# Patient Record
Sex: FEMALE | Race: WHITE | Employment: OTHER | URBAN - METROPOLITAN AREA
[De-identification: names, ages, dates, MRNs, and addresses within clinical notes are randomized per-mention and may not be internally consistent; named-entity substitution may affect disease eponyms.]

---

## 2019-05-17 ENCOUNTER — OFFICE VISIT (OUTPATIENT)
Dept: NEUROLOGY | Age: 79
End: 2019-05-17

## 2019-05-17 VITALS
SYSTOLIC BLOOD PRESSURE: 132 MMHG | HEART RATE: 70 BPM | OXYGEN SATURATION: 97 % | BODY MASS INDEX: 28 KG/M2 | WEIGHT: 164 LBS | RESPIRATION RATE: 20 BRPM | HEIGHT: 64 IN | DIASTOLIC BLOOD PRESSURE: 86 MMHG

## 2019-05-17 DIAGNOSIS — R29.2 HYPERREFLEXIA: ICD-10-CM

## 2019-05-17 DIAGNOSIS — R26.81 UNSTEADY GAIT: Primary | ICD-10-CM

## 2019-05-17 NOTE — PROGRESS NOTES
Name:  Tino Gonzales      :  1940    PCP:   Andreas Kuo MD      Referring:  As above  MRN:   [id number]    Chief Complaint:   Chief Complaint   Patient presents with   Julien Schwab     New Patient (Balance Issues)       HISTORY OF PRESENT ILLNESS:     This is a [de-identified] y.o. female who presents for evaluation of unsteady gait    She describes that she's had some gait difficulty starting around 10 years ago but noticeably worse over the past 3-4 years. She can be walking and suddenly fall in any direction (forward, backwards, side), no warning. She denies any dizziness, spinning, light-headedness, or loss of awareness, or chest discomfort/ palpitations when she falls. Doesn't thinks her legs get weak before she falls. Denies any numbness or tingling in feet or legs. No tremor (at rest or when she's reaching/ holding things). PCP referred her to PT-Balance Therapy sometime in the past 1-2 years and she says it temporarily improved her gait. Complete Review of Systems: +falls, skipped beats; otherwise as noted in HPI     Allergies   Allergen Reactions    Aspirin Hives     Past Medical History:   Diagnosis Date    Hypertension     PUD (peptic ulcer disease)      Current Outpatient Medications   Medication Sig Dispense Refill    amLODIPine (NORVASC) 5 mg tablet Take 5 mg by mouth daily.  ondansetron (ZOFRAN ODT) 4 mg disintegrating tablet Take 1 Tab by mouth every eight (8) hours as needed for Nausea. 10 Tab 0    HYDROcodone-acetaminophen (NORCO) 5-325 mg per tablet 1-2 tabs every 4hrs as needed for pain. Maximum daily dose 12 tablets. 80 Tab 0    docusate sodium (COLACE) 50 mg capsule Take two tabs twice daily for two weeks, then twice a day as needed thereafter. Hold for loose stools. 60 Cap 2    ondansetron (ZOFRAN ODT) 8 mg disintegrating tablet Take 1 Tab by mouth every eight (8) hours as needed for Nausea.  20 Tab 2    topiramate (TOPAMAX) 25 mg tablet Take 50 mg by mouth daily (with breakfast). Indications: MIGRAINE PREVENTION      oxyCODONE-acetaminophen (PERCOCET) 5-325 mg per tablet Take 1 Tab by mouth every four (4) hours as needed for Pain.  baclofen (LIORESAL) 10 mg tablet Take 10 mg by mouth three (3) times daily. Past Surgical History:   Procedure Laterality Date    HX GYN      Hysterectomy    HX OTHER SURGICAL      rectal cysocele     Family History   Problem Relation Age of Onset    Stroke Mother      Social History     Socioeconomic History    Marital status: UNKNOWN     Spouse name: Not on file    Number of children: Not on file    Years of education: Not on file    Highest education level: Not on file   Occupational History    Not on file   Social Needs    Financial resource strain: Not on file    Food insecurity:     Worry: Not on file     Inability: Not on file    Transportation needs:     Medical: Not on file     Non-medical: Not on file   Tobacco Use    Smoking status: Former Smoker   Substance and Sexual Activity    Alcohol use:  Yes     Alcohol/week: 1.2 oz     Types: 2 Glasses of wine per week     Comment: 2 drinks per week    Drug use: Not on file    Sexual activity: Not on file   Lifestyle    Physical activity:     Days per week: Not on file     Minutes per session: Not on file    Stress: Not on file   Relationships    Social connections:     Talks on phone: Not on file     Gets together: Not on file     Attends Protestant service: Not on file     Active member of club or organization: Not on file     Attends meetings of clubs or organizations: Not on file     Relationship status: Not on file    Intimate partner violence:     Fear of current or ex partner: Not on file     Emotionally abused: Not on file     Physically abused: Not on file     Forced sexual activity: Not on file   Other Topics Concern    Not on file   Social History Narrative    Not on file       PHYSICAL EXAM  Vitals:    05/17/19 1004 05/17/19 1058 05/17/19 1059 05/17/19 1101 BP: 118/78 130/80 132/84 132/86   BP 1 Location: Left arm  Right arm Right arm   BP Patient Position: Sitting  Sitting Standing   Pulse: 65 71 87 70   Resp: 20      SpO2: 99% 95% 97% 97%   Weight: 74.4 kg (164 lb)      Height: 5' 4\" (1.626 m)          General:  Alert, cooperative, NAD   Head:  Normocephalic, atraumatic. Eyes:  Conjunctivae/corneas clear   Lungs:  Heart:  Non labored breathing  Regular rate, rhythm; + Cardiac Murmur (chronic)   Extremities: No edema. Skin: No rashes    Neurologic Exam       Language: normal  Memory:  Alert, oriented to person, place, situation    Cranial Nerves:  I: smell Not tested   II: visual fields Full to confrontation   II: pupils Equal, round, reactive to light   II: optic disc No papilledema   III,VII: ptosis Mild chronic ptosis of right side; no ptosis of left side   III,IV,VI: extraocular muscles  normal   V: facial light touch sensation  normal   VII: facial muscle function  symmetric   VIII: hearing symmetric   IX: soft palate elevation  normal   XI: sternocleidomastoid strength 5/5   XII: tongue  midline      Motor: symmetric bulk; normal tone in arms, slight/ borderline increased tone in legs    Upper exts: 5/5  Lower exts: 5/5    Sensory: intact to LT, PP, temp, vibration x 4 exts     Cerebellar: no rest, postural, or intention tremor  Normal FNF    Reflexes: slightly brisk throughout 2-3+ throughout    Plantar response: up-going on left, neutral on right      Gait: ambulates with left leg slightly dragging behind right leg    Romberg negative     No outside clinic notes/ imaging reports available for review    ASSESSMENT AND PLAN    ICD-10-CM ICD-9-CM    1. Unsteady gait R26.81 781.2 MRI BRAIN WO CONT      MRI CERV SPINE WO CONT   2. Hyperreflexia R29.2 796.1 MRI BRAIN WO CONT      MRI CERV SPINE WO CONT     [de-identified] y.o. female with chronic gait difficulty, increasing over the past 3-4 years.  Now having frequent falls without warning, no loss of awareness, no dizziness or vertigo. Exam showed mild generalized hyperreflexia, up-going toe on left, no definite spasticity, and gait was abnormal with left leg appearing to have slight drag/ weakness when walking. Sensory exam was normal, and Romberg was negative. Check MRI Brain to evaluate for structural lesion (?old stroke) and MRI C-spine to eval for possible cervical myelopathy. Pt agreeable with that plan. F/u in 4-6 weeks to go over results. Thank you for allowing me to be a part of your patient's care. Please call me at [phone] if you have any questions.      Sincerely,  Olivia Nguyen MD

## 2019-05-17 NOTE — PROGRESS NOTES
Patient is here as a new patient for falls. She states that she has had frequent falls over the past 4-5 years. Have increased recently. She states that she feels like her legs give out. This past fall, she hit her head on concrete. No other concerns at this time.

## 2019-05-30 ENCOUNTER — HOSPITAL ENCOUNTER (OUTPATIENT)
Dept: MRI IMAGING | Age: 79
Discharge: HOME | End: 2019-05-30
Attending: PSYCHIATRY & NEUROLOGY
Payer: MEDICARE

## 2019-05-30 DIAGNOSIS — R26.81 UNSTEADY GAIT: ICD-10-CM

## 2019-05-30 DIAGNOSIS — R29.2 HYPERREFLEXIA: ICD-10-CM

## 2019-05-30 PROCEDURE — 70551 MRI BRAIN STEM W/O DYE: CPT

## 2019-05-30 PROCEDURE — 72141 MRI NECK SPINE W/O DYE: CPT

## 2019-06-19 ENCOUNTER — OFFICE VISIT (OUTPATIENT)
Dept: NEUROLOGY | Age: 79
End: 2019-06-19

## 2019-06-19 VITALS
HEIGHT: 64 IN | RESPIRATION RATE: 20 BRPM | SYSTOLIC BLOOD PRESSURE: 118 MMHG | OXYGEN SATURATION: 98 % | DIASTOLIC BLOOD PRESSURE: 78 MMHG | HEART RATE: 66 BPM | BODY MASS INDEX: 28 KG/M2 | WEIGHT: 164 LBS

## 2019-06-19 DIAGNOSIS — R26.81 UNSTEADY GAIT: ICD-10-CM

## 2019-06-19 DIAGNOSIS — R41.3 MEMORY DIFFICULTY: Primary | ICD-10-CM

## 2019-06-19 NOTE — PROGRESS NOTES
Patient is here for a follow up for unsteady gait and hyperflexia. Has had her MRI brain and c-spine done. She states that she has been feeling some days are a little shaky but for the most part, pretty good. No falls since she had her last appointment.

## 2019-06-19 NOTE — PROGRESS NOTES
Neurology Progress Note    Patient ID:   Geo Lisseth  [id number]  [de-identified] y.o.  1940    Date of Office Visit: 06/19/19    Chief Complaint   Patient presents with    Neurologic Problem     Follow Up (unsteady gait, hyperflexia)     Interval Hx:     Reviewed MRI Brain and C-spine images and reports. MRI Brain shows: cerebral atrophy with enlarged ventricles, mild more than moderate chronic small vessel ischemic changes. No evidence of stroke. MRI C-spine: mild disc bulges C5-6 to C7-T1, mild central canal stenosis at C6-7, no spinal compression, no spinal cord lesions. Pt continues to have episodic unsteady gait. She denies any frequent issues with urinary urgency, denies any urinary incontinence. On questioning, pt says she's had some mild forgetfulness over the past 2 years. Freda Jamie to remember the names of certain things (flowers for example) or remember someone's name. Seems to come back to her soon, by the next day. Objective: Allergies   Allergen Reactions    Aspirin Hives       PMHx:  Past Medical History:   Diagnosis Date    Hypertension     PUD (peptic ulcer disease)      PSHx:  has a past surgical history that includes hx gyn and hx other surgical.    Current Outpatient Medications on File Prior to Visit   Medication Sig    amLODIPine (NORVASC) 5 mg tablet Take 5 mg by mouth daily.  ondansetron (ZOFRAN ODT) 4 mg disintegrating tablet Take 1 Tab by mouth every eight (8) hours as needed for Nausea.  HYDROcodone-acetaminophen (NORCO) 5-325 mg per tablet 1-2 tabs every 4hrs as needed for pain. Maximum daily dose 12 tablets.  docusate sodium (COLACE) 50 mg capsule Take two tabs twice daily for two weeks, then twice a day as needed thereafter. Hold for loose stools.  ondansetron (ZOFRAN ODT) 8 mg disintegrating tablet Take 1 Tab by mouth every eight (8) hours as needed for Nausea.  topiramate (TOPAMAX) 25 mg tablet Take 50 mg by mouth daily (with breakfast). Indications: MIGRAINE PREVENTION    oxyCODONE-acetaminophen (PERCOCET) 5-325 mg per tablet Take 1 Tab by mouth every four (4) hours as needed for Pain.  baclofen (LIORESAL) 10 mg tablet Take 10 mg by mouth three (3) times daily. No current facility-administered medications on file prior to visit. Physical Exam  Vitals:    06/19/19 1422   BP: 118/78   Pulse: 66   Resp: 20   SpO2: 98%   Weight: 74.4 kg (164 lb)   Height: 5' 4\" (1.626 m)       General:   Mental status: Awake, alert, cooperative. Neck: supple  Extremities: no edema  Skin: no rashes    Neuro Exam:    CNs:   Facial movements: symmetric. Visual fields; intact in all quadrants, bilateral EOM: conjugate eye movements bilateral  Hearing: normal  Speech: no aphasia, no dysarthria, normal fluency    Motor:  Bulk: Normal, symmetric in all exts  Tone: normal in all extremities  Strength: 5/5 in upper and lower extremities, symmetric. Coordination: No resting, postural, or intention tremors. Sensory: intact to LT in all exts. Reflexes:  2+ patellars  Gait: stands independently, when starting to ambulate, leans to right and catches herself on wall. During ambulation, right leg doesn't seem to move as freely/ fluidly as left leg. Assessment:       ICD-10-CM ICD-9-CM    1. Memory difficulty R41.3 780.93 REFERRAL TO NEUROLOGY      VITAMIN B12      TSH 3RD GENERATION   2. Unsteady gait R26.81 781. 2 EMG LIMITED      PROTEIN ELECTROPHORESIS W/ REFLX STEVEN      ANTINUCLEAR ANTIBODIES, IFA     Check EMG/ NCS to evaluate for peripheral neuropathy  Check Neuropathy labs  Check B12/ TSH for memory eval  Refer to Payal Rodríguez for memory evaluation    Considering NPH but ventricles not dilated out of proportion to cerebral atrophy, and pt denies any significant/ noticeable bladder issues, though does report mild memory disturbance and gait disturbance.   Will check the above and if no clear answer to her gait difficulty, will refer her to [facility] for their opinion.          Signed:   Hector Jaramillo MD  June 19, 2019

## 2019-06-22 LAB
ALBUMIN SERPL ELPH-MCNC: 4.1 G/DL (ref 2.9–4.4)
ALBUMIN/GLOB SERPL: 1.3 {RATIO} (ref 0.7–1.7)
ALPHA1 GLOB SERPL ELPH-MCNC: 0.2 G/DL (ref 0–0.4)
ALPHA2 GLOB SERPL ELPH-MCNC: 0.7 G/DL (ref 0.4–1)
ANA HOMOGEN TITR SER: ABNORMAL {TITER}
ANA SPECKLED TITR SER: ABNORMAL {TITER}
ANA TITR SER IF: POSITIVE {TITER}
B-GLOBULIN SERPL ELPH-MCNC: 1.3 G/DL (ref 0.7–1.3)
GAMMA GLOB SERPL ELPH-MCNC: 1 G/DL (ref 0.4–1.8)
GLOBULIN SER CALC-MCNC: 3.2 G/DL (ref 2.2–3.9)
Lab: ABNORMAL
M PROTEIN SERPL ELPH-MCNC: NORMAL G/DL
PLEASE NOTE, 011150: NORMAL
PROT PATTERN SERPL ELPH-IMP: NORMAL
PROT SERPL-MCNC: 7.3 G/DL (ref 6–8.5)
TSH SERPL DL<=0.005 MIU/L-ACNC: 1.54 UIU/ML (ref 0.45–4.5)
VIT B12 SERPL-MCNC: 243 PG/ML (ref 232–1245)

## 2019-06-26 ENCOUNTER — TELEPHONE (OUTPATIENT)
Dept: NEUROLOGY | Age: 79
End: 2019-06-26

## 2019-06-26 NOTE — TELEPHONE ENCOUNTER
Dr Lawanda Nolan calling pt in office now needs last 2 office notes and Mri notes please fax [phone]

## 2019-07-03 DIAGNOSIS — R26.81 UNSTEADY GAIT: ICD-10-CM

## 2019-07-10 DIAGNOSIS — R76.8 ANA POSITIVE: Primary | ICD-10-CM

## 2019-07-10 NOTE — PROGRESS NOTES
Normal TSH and SPEP    B12 level is borderline low; pt will need to talk with PCP about starting B12 supplementation, and rechecking B12 in a few months. ZANDRA was abnormal/ positive, but is unrelated to her gait difficulty. This suggests she has some underlying inflammatory condition and will need to see Rheumatologist for further evaluation.   I entered a referral to [facility]/ Dr Dominguez Fuel

## 2019-07-11 ENCOUNTER — TELEPHONE (OUTPATIENT)
Dept: NEUROLOGY | Age: 79
End: 2019-07-11

## 2019-07-11 NOTE — TELEPHONE ENCOUNTER
Contacted patient to let her know that Doran Sandifer looked at her lab results, and per  \"Normal TSH and SPEP     B12 level is borderline low; pt will need to talk with PCP about starting B12 supplementation, and rechecking B12 in a few months.      ZANDRA was abnormal/ positive, but is unrelated to her gait difficulty. This suggests she has some underlying inflammatory condition and will need to see Rheumatologist for further evaluation. I entered a referral to [facility]/ Dr Lenard Mohs. \"     I stated to her that I will send the referral to 's office and they will call her with an appt. She verbalized understanding. She also wanted to check with  to see if she still needed to come in for the EMG. I stated to her that I will send him the message to ask him and then I will give her a call. She verbalized understanding.

## 2019-07-11 NOTE — TELEPHONE ENCOUNTER
Yes on the EMG. Its to help evaluate for any peripheral neuropathy, that could be related to her walking difficulty.

## 2019-07-15 ENCOUNTER — TELEPHONE (OUTPATIENT)
Dept: NEUROLOGY | Age: 79
End: 2019-07-15

## 2019-07-15 NOTE — TELEPHONE ENCOUNTER
Contacted patient back to let her know that per  \"Yes on the EMG. Its to help evaluate for any peripheral neuropathy, that could be related to her walking difficulty. \" she verbalized understanding and has made an appt with the rheumatologist and will be at our office for her EMG.

## 2019-07-15 NOTE — TELEPHONE ENCOUNTER
----- Message from Alexey Herr sent at 7/15/2019 12:15 PM EDT -----  Regarding: Dr Nowak/ telephone  The pt returned the call from the nurse on thursday and is requesting another back. She believes it was in regards to her EMG.     Best contact number is [phone]

## 2019-07-24 ENCOUNTER — OFFICE VISIT (OUTPATIENT)
Dept: NEUROLOGY | Age: 79
End: 2019-07-24

## 2019-07-24 DIAGNOSIS — R41.3 MEMORY DIFFICULTY: ICD-10-CM

## 2019-07-24 DIAGNOSIS — R26.81 UNSTEADY GAIT: Primary | ICD-10-CM

## 2019-07-24 NOTE — PROCEDURES
EMG/ NCS Report  [facility]  - [facility]  [address] [facility], [address] [place] [address] [address]   Ph: [phone]   FAX: [phone]/ [phone]  Test Date:  2019    Patient: Fabienne Fuentes : 1940 Physician: Svitlana Morales M.D. Sex: Female Height: ' \" Ref Phys: Svitlana Morales M.D.   ID#: [id number] Weight:  lbs. Technician: Sb Jauregui     Patient History:    CC: gait difficulty, unsteadiness. Evaluate for peripheral neuropathy    EMG & NCV Findings:  Evaluation of the right Fibular motor nerve showed normal distal onset latency (2.8 ms), normal amplitude (4.5 mV), normal conduction velocity (B Fib-Ankle, 49 m/s), and normal conduction velocity (Poplt-B Fib, 67 m/s). The right median motor nerve showed normal distal onset latency (3.9 ms), normal amplitude (7.5 mV), and normal conduction velocity (Elbow-Wrist, 49 m/s). The right tibial motor nerve showed normal distal onset latency (4.8 ms), normal amplitude (3.1 mV), and normal conduction velocity (Knee-Ankle, 48 m/s). The right ulnar motor nerve showed normal distal onset latency (3.0 ms), normal amplitude (8.2 mV), normal conduction velocity (B Elbow-Wrist, 61 m/s), and normal conduction velocity (A Elbow-B Elbow, 53 m/s). The right median sensory, the right radial sensory, the right sural sensory, and the right ulnar sensory nerves showed normal distal peak latency (R3.8, R2.2, R3.8, R3.3 ms) and normal amplitude (R36.8, R54.0, R4.2, R35.0 µV). The right Sup Fibular sensory nerve showed normal distal peak latency (2.6 ms), normal amplitude (5.2 µV), and normal conduction velocity (Lower leg-Lat ankle, 45 m/s).   The right median/ulnar (palm) comparison nerve showed normal distal onset latency (Median Palm, 1.8 ms), prolonged distal peak latency (Median Palm, 2.4 ms), normal amplitude (Median Palm, 27.2 µV), normal distal onset latency (Ulnar Palm, 1.4 ms), normal distal peak latency (Ulnar Palm, 2.2 ms), and normal amplitude (Ulnar Palm, 12.3 µV). All F Wave latencies were within normal limits. Needle evaluation of the right anterior tibialis muscle showed increased motor unit amplitude. All remaining muscles (as indicated in the following table) showed no evidence of electrical instability.       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      Nerve Conduction Studies  Anti Sensory Summary Table     Stim Site NR Peak (ms) Norm Peak (ms) P-T Amp (µV) Norm P-T Amp Site1 Site2 Dist (cm)   Right Median Anti Sensory (2nd Digit)  32.3°C   Wrist    3.8 <4 36.8 >13 Wrist 2nd Digit 14.0   Right Radial Anti Sensory (Base 1st Digit)  31.7°C   Wrist    2.2 <2.8 54.0 >11 Wrist Base 1st Digit 10.0   Right Sup Fibular Anti Sensory (Lat ankle)  32°C   Lower leg    2.6 <4.6 5.2 >4 Lower leg Lat ankle 10.0   Site 2    2.6  5.4       Site 3    2.3  4.0           2.6  1.9       Right Sural Anti Sensory (Lat Mall)  32.9°C   Calf    3.8 <4.5 4.2 >4.0 Calf Lat Mall 14.0   Site 2    3.6  4.8           3.8  5.6       Right Ulnar Anti Sensory (5th Digit)  32.2°C   Wrist    3.3 <4.0 35.0 >9 Wrist 5th Digit 14.0     Motor Summary Table     Stim Site NR Onset (ms) Norm Onset (ms) O-P Amp (mV) Norm O-P Amp Amp (Prev) (%) Site1 Site2 Dist (cm) Derrick (m/s) Norm Derrick (m/s)   Right Fibular Motor (Ext Dig Brev)  31.8°C   Ankle    2.8 <6.5 4.5 >1.1 100.0 Ankle Ext Dig Brev 8.0     B Fib    9.3  4.2  93.3 B Fib Ankle 32.0 49 >38   Poplt    10.8  4.1  97.6 Poplt B Fib 10.0 67 >42   Right Median Motor (Abd Poll Brev)  31.5°C   Wrist    3.9 <4.5 7.5 >4.1 100.0 Wrist Abd Poll Brev 8.0     Elbow    8.0  7.3  97.3 Elbow Wrist 20.0 49 >49   Right Tibial Motor (Abd Mcdonald Brev)  31.6°C   Ankle    4.8 <6.1 3.1 >1.1 100.0 Ankle Abd Mcdonald Brev 8.0 Knee    12.5  2.2  71.0 Knee Ankle 37.0 48 >39   Right Ulnar Motor (Abd Dig Minimi)  30.9°C   Wrist    3.0 <3.1 8.2 >7.0 100.0 Wrist Abd Dig Minimi 8.0  >50   B Elbow    6.1  7.0  85.4 B Elbow Wrist 19.0 61 >50   A Elbow    8.0  6.8  97.1 A Elbow B Elbow 10.0 53 >50     Comparison Summary Table     Stim Site NR Peak (ms) P-T Amp (µV) Site1 Site2 Dist (cm) Delta-0 (ms)   Right Median/Ulnar Palm Comparison (Wrist)  32.5°C   Median Palm    2.4 23.5 Median Palm Ulnar Palm 8.0 0.4   Ulnar Palm    2.2 10.2         F Wave Studies     NR F-Lat (ms) Lat Norm (ms) L-R F-Lat (ms) L-R Lat Norm   Right Tibial (Mrkrs) (Abd Hallucis)  31.5°C      46.10 <56  <5.7   Right Ulnar (Mrkrs) (Abd Dig Min)  30.6°C      26.62 <32  <2.5     H Reflex Studies     NR H-Lat (ms) L-R H-Lat (ms) L-R Lat Norm   Right Tibial (Gastroc)  31.5°C      34.36  <2.0     EMG     Side Muscle Nerve Root Ins Act Fibs Psw Recrt Duration Amp Poly Comment   Right MedGastroc Tibial S1-2 Nml Nml Nml Nml Nml Nml Nml    Right AntTibialis Dp Br Peron L4-5 Nml Nml Nml Nml Nml Incr Nml    Right VastusMed Femoral L2-4 Nml Nml Nml Nml Nml Nml Nml    Right GluteusMed SupGluteal L4-S1 Nml Nml Nml Nml Nml Nml Nml    Right Lower Lumb Parasp Rami L5,S1 Nml Nml Nml Nml Nml Nml Nml    Right Abd Poll Brev Median C8-T1 Nml Nml Nml Nml Nml Nml Nml    Right FlexDigProf Ulnar C8,T1 Nml Nml Nml Nml Nml Nml Nml    Right Triceps Radial C6-7-8 Nml Nml Nml Nml Nml Nml Nml    Right Deltoid Axillary C5-6 Nml Nml Nml Nml Nml Nml Nml    Right Lower Cerv Parasp Rami C7,T1 Nml Nml Nml Nml Nml Nml Nml      Waveforms:

## 2019-07-24 NOTE — PATIENT INSTRUCTIONS
Make sure to take a CD with your last MRI Brain and MRI Cervical Spine with you to your visit at [facility]

## 2019-07-30 ENCOUNTER — TELEPHONE (OUTPATIENT)
Dept: NEUROLOGY | Age: 79
End: 2019-07-30

## 2019-07-31 NOTE — TELEPHONE ENCOUNTER
Contacted patient back to let her know that I got her message. She verbalized understanding and stated that they called her and stated that  will not see anyone without records. I stated that I sent the referral over electronically and I will fax over all of the neurology notes to his office. She verbalized understanding.

## 2019-08-14 ENCOUNTER — TELEPHONE (OUTPATIENT)
Dept: NEUROLOGY | Age: 79
End: 2019-08-14

## 2019-08-14 NOTE — TELEPHONE ENCOUNTER
----- Message from Alexey Herr sent at 8/14/2019  8:40 AM EDT -----  Regarding: Dr Nowak/ telephone  The pt is requesting a callback because Dr Jessica Tapia office hasnt received her records and she was told they would be sent last week; without those she cant be seen.        Best contact number is [phone]

## 2019-09-11 ENCOUNTER — OFFICE VISIT (OUTPATIENT)
Dept: NEUROLOGY | Age: 79
End: 2019-09-11

## 2019-09-11 DIAGNOSIS — R41.3 SHORT-TERM MEMORY LOSS: ICD-10-CM

## 2019-09-11 DIAGNOSIS — G31.84 MILD COGNITIVE IMPAIRMENT: Primary | ICD-10-CM

## 2019-09-11 DIAGNOSIS — Z81.8 FAMILY HISTORY OF DEMENTIA: ICD-10-CM

## 2019-09-11 DIAGNOSIS — F41.8 ANXIETY ABOUT HEALTH: ICD-10-CM

## 2019-09-11 DIAGNOSIS — R41.840 ATTENTION DEFICIT: ICD-10-CM

## 2019-09-11 DIAGNOSIS — F43.21 ADJUSTMENT DISORDER WITH DEPRESSED MOOD: ICD-10-CM

## 2019-09-11 DIAGNOSIS — R47.89 WORD FINDING DIFFICULTY: ICD-10-CM

## 2019-09-11 NOTE — LETTER
9/12/19 Patient: Jossue Mejia YOB: 1940 Date of Visit: 9/11/2019 Ramirez Tesfaye MD 
[address] [address] VIA Facsimile: [phone] Dear Ramirez Tesfaye MD, Thank you for referring Ms. Jossue Mejia to [facility] for evaluation. My notes for this consultation are attached. If you have questions, please do not hesitate to call me. I look forward to following your patient along with you. Sincerely, Stephanie Stoll PsyD

## 2019-09-11 NOTE — PROGRESS NOTES
[address] [facility],5Th Floor  [address]   [place] [address] [facility] [address]   [phone] Office   [phone] Fax      Neuropsychology    Initial Diagnostic Interview Note      Referral:  Natacha Corrales MD    Artist Distance is a [de-identified] y.o. right handed   female who was unaccompanied to the initial clinical interview on 9/11/19 . Please refer to her medical records for details pertaining to her history. Briefly, the patient reported that she completed high school without history of previously diagnosed LD and/or receipt of special education services. 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 Spouse had a stroke four years ago and son took over after that and granddaughter came in to assist.  Hasn't worked in about four years. She has had imaging done. MRI Brain shows: cerebral atrophy with enlarged ventricles, mild more than moderate chronic small vessel ischemic changes. No evidence of stroke. MRI C-spine: mild disc bulges C5-6 to C7-T1, mild central canal stenosis at C6-7, no spinal compression, no spinal cord lesions. She has had some unsteady gait. She has seen Dr. Marylen Poet. Her mother and three sisters and all of the had Alzheimer's and patient would like evaluation for same. She has mild forgetfulness over the past two years. She forgets names of certain things, like people of types of flowers. Sometimes forgets the content of conversations. Spouse will ask her to do something and if she's asked to do something else she will forget the first time  Mild but noticeable decline over the past two years. She has a history of multiple head injuries. She had balance problems. She denied problems with urinary frequency. No major psychiatric history but anxious about her health.   No background stroke, meningitis/encephalitis, PINO FEver, Lupus, Lyme, sz.  Last fall when she hit her head was in May and she fell backwards without LOS. She sleeps well. Appetite is good. No changes in sense of taste or smell. No previous neuropsych    Neuropsychological Mental Status Exam (NMSE):  Historian: Good  Praxis: No UE apraxia  R/L Orientation: Intact to self and to other  Dress: within normal limits   Weight: within normal limits   Appearance/Hygiene: within normal limits   Gait: within normal limits   Assistive Devices: Glasses  Mood: within normal limits   Affect: within normal limits   Comprehension: within normal limits   Thought Process: within normal limits   Expressive Language: within normal limits   Receptive Language: within normal limits   Motor:  No cognitive or motor perseveration  ETOH: 1-2 drinks a week  Tobacco:  Smoked until age 38 and quit  Illicit: Denied  SI/HI: Denied  Psychosis: Denied  Insight: Within normal limits  Judgment: Within normal limits  Other Psych:      Past Medical History:   Diagnosis Date    Hypertension     PUD (peptic ulcer disease)        Past Surgical History:   Procedure Laterality Date    HX GYN      Hysterectomy    HX OTHER SURGICAL      rectal cysocele       Allergies   Allergen Reactions    Aspirin Hives       Family History   Problem Relation Age of Onset    Stroke Mother        Social History     Tobacco Use    Smoking status: Former Smoker   Substance Use Topics    Alcohol use: Yes     Alcohol/week: 2.0 standard drinks     Types: 2 Glasses of wine per week     Comment: 2 drinks per week    Drug use: Not on file       Current Outpatient Medications   Medication Sig Dispense Refill    ondansetron (ZOFRAN ODT) 4 mg disintegrating tablet Take 1 Tab by mouth every eight (8) hours as needed for Nausea. 10 Tab 0    HYDROcodone-acetaminophen (NORCO) 5-325 mg per tablet 1-2 tabs every 4hrs as needed for pain. Maximum daily dose 12 tablets.  80 Tab 0    docusate sodium (COLACE) 50 mg capsule Take two tabs twice daily for two weeks, then twice a day as needed thereafter. Hold for loose stools. 60 Cap 2    ondansetron (ZOFRAN ODT) 8 mg disintegrating tablet Take 1 Tab by mouth every eight (8) hours as needed for Nausea. 20 Tab 2    topiramate (TOPAMAX) 25 mg tablet Take 50 mg by mouth daily (with breakfast). Indications: MIGRAINE PREVENTION      oxyCODONE-acetaminophen (PERCOCET) 5-325 mg per tablet Take 1 Tab by mouth every four (4) hours as needed for Pain.  baclofen (LIORESAL) 10 mg tablet Take 10 mg by mouth three (3) times daily.  amLODIPine (NORVASC) 5 mg tablet Take 5 mg by mouth daily. Plan:  Obtain authorization for testing from insurance company. Report to follow once testing, scoring, and interpretation completed. ? Organic based neurocognitive issues versus mood disorder or combination of same. ? Problems organic, functional, or both? This note will not be viewable in [address]               [id number]*1 NSE

## 2019-09-12 NOTE — PROGRESS NOTES
[address] [facility],5Th Floor  [address]   [place] [address] [facility] [place] [address]   [phone] Office   [phone] Fax      Neuropsychological Evaluation Report  Referral:  Mary Simmons MD    Oswaldo Marcelo is a [de-identified] y.o. right handed   female who was unaccompanied to the initial clinical interview on 9/11/19 . Please refer to her medical records for details pertaining to her history. Briefly, the patient reported that she completed high school without history of previously diagnosed LD and/or receipt of special education services. 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 Spouse had a stroke four years ago and son took over after that and granddaughter came in to assist.  Hasn't worked in about four years. She has had imaging done. MRI Brain shows: cerebral atrophy with enlarged ventricles, mild more than moderate chronic small vessel ischemic changes. No evidence of stroke. MRI C-spine: mild disc bulges C5-6 to C7-T1, mild central canal stenosis at C6-7, no spinal compression, no spinal cord lesions. She has had some unsteady gait. She has seen Dr. Meggan Lu. Her mother and three sisters and all of the had Alzheimer's and patient would like evaluation for same. She has mild forgetfulness over the past two years. She forgets names of certain things, like people of types of flowers. Sometimes forgets the content of conversations. Spouse will ask her to do something and if she's asked to do something else she will forget the first time  Mild but noticeable decline over the past two years. She has a history of multiple head injuries. She had balance problems. She denied problems with urinary frequency. No major psychiatric history but anxious about her health.   No background stroke, meningitis/encephalitis, PINO FEver, Lupus, Lyme, sz.  Last fall when she hit her head was in May and she fell backwards without LOS. She sleeps well. Appetite is good. No changes in sense of taste or smell. No previous neuropsych    Neuropsychological Mental Status Exam (NMSE):  Historian: Good  Praxis: No UE apraxia  R/L Orientation: Intact to self and to other  Dress: within normal limits   Weight: within normal limits   Appearance/Hygiene: within normal limits   Gait: within normal limits   Assistive Devices: Glasses  Mood: within normal limits   Affect: within normal limits   Comprehension: within normal limits   Thought Process: within normal limits   Expressive Language: within normal limits   Receptive Language: within normal limits   Motor:  No cognitive or motor perseveration  ETOH: 1-2 drinks a week  Tobacco:  Smoked until age 38 and quit  Illicit: Denied  SI/HI: Denied  Psychosis: Denied  Insight: Within normal limits  Judgment: Within normal limits  Other Psych:      Past Medical History:   Diagnosis Date    Hypertension     PUD (peptic ulcer disease)        Past Surgical History:   Procedure Laterality Date    HX GYN      Hysterectomy    HX OTHER SURGICAL      rectal cysocele       Allergies   Allergen Reactions    Aspirin Hives       Family History   Problem Relation Age of Onset    Stroke Mother        Social History     Tobacco Use    Smoking status: Former Smoker   Substance Use Topics    Alcohol use: Yes     Alcohol/week: 2.0 standard drinks     Types: 2 Glasses of wine per week     Comment: 2 drinks per week    Drug use: Not on file       Current Outpatient Medications   Medication Sig Dispense Refill    ondansetron (ZOFRAN ODT) 4 mg disintegrating tablet Take 1 Tab by mouth every eight (8) hours as needed for Nausea. 10 Tab 0    HYDROcodone-acetaminophen (NORCO) 5-325 mg per tablet 1-2 tabs every 4hrs as needed for pain. Maximum daily dose 12 tablets.  80 Tab 0    docusate sodium (COLACE) 50 mg capsule Take two tabs twice daily for two weeks, then twice a day as needed thereafter. Hold for loose stools. 60 Cap 2    ondansetron (ZOFRAN ODT) 8 mg disintegrating tablet Take 1 Tab by mouth every eight (8) hours as needed for Nausea. 20 Tab 2    topiramate (TOPAMAX) 25 mg tablet Take 50 mg by mouth daily (with breakfast). Indications: MIGRAINE PREVENTION      oxyCODONE-acetaminophen (PERCOCET) 5-325 mg per tablet Take 1 Tab by mouth every four (4) hours as needed for Pain.  baclofen (LIORESAL) 10 mg tablet Take 10 mg by mouth three (3) times daily.  amLODIPine (NORVASC) 5 mg tablet Take 5 mg by mouth daily. Plan:  Obtain authorization for testing from insurance company. Report to follow once testing, scoring, and interpretation completed. ? Organic based neurocognitive issues versus mood disorder or combination of same. ? Problems organic, functional, or both? This note will not be viewable in [address] [address]*8 NSE      Neuropsychological Test Results  Patient Testing 9/11/19 Report Completed 9/12/19  A Psychometrist Assisted w/ portions of this evaluation while under my direct  supervision    The following evaluation procedures/tests were administered:      Neuropsychologist Performed, Interpreted, & Reported:  Neuropsychological Mental Status Exam, Revised Memory & Behavior Checklist,  Mini Mental Status Exam, Clock Drawing Test, Rich-Melzack Pain Questionnaire, Test Of Premorbid Functioning, History Taking  & Clinical Interview With The Patient, SHIRLEY, CPT-III, Review Of Available Records. Psychometrist Administered under Neuropsychologist Supervision & Neuropsychologist Interpreted & Neuropsychologist Reported:  Verbal Fluency Tests, Jean & Jean - Revised, Trailmaking Test Parts A & B, Wechsler Adult Intelligence Scale - IV, [place] Verbal Learning Test - 3, Grooved Pegboard, Beck Depression Inventory - II, Beck Anxiety Inventory.    Test Findings:  Test Findings:  Note:  The patients raw data have been compared with currently available norms which include demographic corrections for age, gender, and/or education. Sometimes, the patients scores are compared to demographically similar individuals as close to the patients age, education level, etc., as possible. \"Average\" is viewed as being +/- 1 standard deviation (SD) from the stated mean for a particular test score. \"Low average\" is viewed as being between 1 and 2 SD below the mean, and above average is viewed as being 1 and 2 SD above the mean. Scores falling in the borderline range (between 1-1/2 and 2 SD below the mean) are viewed with particular attention as to whether they are normal or abnormal neurocognitive test scores. Other methods of inference in analyzing the test data are also utilized, including the pattern and range of scores in the profile, bilateral motor functions, and the presence, if any, of pathognomonic signs. Behaviorally, the patient was friendly and cooperative and appeared motivated to perform well during this examination. Within this context, the results of this evaluation are viewed as a valid reflection of the patients actual neurocognitive and emotional status. The patient's score of 29/30 was normal.    In this regard, recall for three words after a brief delay was 2/3 correct. Clock drawing was within normal limits. Her structured word list fluency, as assessed by the FAS Test, was within the below average range with a T score of 41. Category fluency was within the average range with a T score of 56. Confrontation naming ability, as assessed by the [facility] - Revised, was within the average range at 52/60 correct (T = 52). This pattern of performance is not indicative of a patient who is at increased risk for day-to-day problems with verbal fluency or confrontation naming.        The patient was administered the [facility] Continuous Performance Test - III and review of the subscales within this instrument revealed mild concerns for inattentiveness without impulsivity. This pattern of performance is indicative of a patient who is at increased risk for day-to-day problems with sustained visual attention/concentration. The patient is not showing problems with working memory capacity (63rd %ile) or processing speed (70th  %ile) on the WAIS-IV. Her Verbal Comprehension Index score of 103 (58th %ile). Her Perceptual Reasoning Index score of 104 was average . These scores do not reflect a decline in functioning based on an assessment of premorbid functioning. The patient was administered the [place] Verbal Learning Test  - 3 and generated a normal range and positive learning curve over five repeated auditory word list learning trials. An interference trial was normal.   Free and cued, short and long delayed recall were all normal.   Recognition and forced choice recall were normal.   This pattern of performance is not indicative of a patient who is at increased risk for day-to-day problems with auditory learning and/or memory. Simple timed visual motor sequencing (Trailmaking Test Part A) was within the average range with a T score of 53. Her performance on a similar, but more complex task of timed visual motor sequencing (Trailmaking Test Part B) was within the average range with a T score of 54 (0 errors). This pattern of performance is not indicative of a patient who is at increased risk for day-to-day problems with executive functioning. Fine motor dexterity was within the normal range bilaterally. This does not raise concern for a particularly lateralized brain dysfunction. The patient rated her current level of pain as \"0/5- No Pain\" on the Rich-Melzack Pain Questionnaire. She reported pain in her right hand due to arthritis     Her Paul Depression Inventory -II score of 3 was within the minimally  depressed range.   Her Paul Anxiety Inventory score of 2 reflected minimal anxiety. The patient's responses on the Personality Assessment Inventory were deemed valid for interpretation. Within this context, her self-concept is fixed, harsh and negative. She is inwardly more troubled by self-doubt and misgivings about her adequacy than is readily apparent to others. She tends to be somewhat submissive and conforming in her relationships. Other may take advantage of this. The personality profile is otherwise normal.      Impressions & Recommendations: This is a predominantly normal range Neuropsychological Evaluation with respect to neurocognitive functioning. In this regard, the only (and mild) impairment noted was for sustained visual attention. Otherwise, her performances across all other neurocognitive domains assessed, including mental status, confrontation naming, verbal fluency, perceptual reasoning, verbal comprehension, bilateral fine motor dexterity, auditory learning, auditory memory, processing speed, working memory, and executive functioning remain well within the normal range. From an emotional standpoint, there is concern for mild depression and she is anxious about her health, in particular given the family history of dementia. Thankfully this profile is not consistent with MCI or dementia. She does have a vascular history raising concern for same but the current profile is more suggestive of a mild/chronic attention deficit issue exacerbated by age, mild depressive features, and anxiety/stress/worry about her health. The attention deficit issue is quite mild and chronic and perhaps not appropriate for treatment at this time, but this can be considered if not medically contraindicated. Supportive psychotherapy is advised on a prn basis. She should be encouraged to remain as mentally, socially, and physically active as possible. I am not concerned about competency, driving, day-to-day supervision, etc.   Baseline now established. Follow up prn.   Clinical correlation is, of course, indicated. I will discuss these findings with the patient when she follows up with me in the near future. A follow up Neuropsychological Evaluation is indicated on a prn basis, especially if there are any cognitive and/or emotional changes. DIAGNOSES:  The Referral Dx of MCI - IS NOT SUPPORTED     Anxiety about health     Adjustment Disorder with Mildly Depressed Features     ADHD, Inattentive, Mild Chronic     The above information is based upon information currently available to me. If there is any additional information of which I am currently unaware, I would be more than happy to review it upon having it made available to me. Thank you for the opportunity to see this interesting individual.     Sincerely,       Maya Seo. Ro Sargent, EdS    CC:  Jeni Ramon MD    Time Documentation:    00597 x 1: Neurobehavioral Status Exam/Clinical Interview: (1 hour, (already billed on first date of service)    29096 x 1: Neurobehavioral Status Exam, Additional: (35 additional minutes, see above)     96138 x 1  96139 x 5 Test Administration/Data Gathering By Technician: (3 hours). 29658 x 1 (first 30 minutes), 72880 x 5 (each additional 30 minutes)    96132 x 1  96133 x 1 Testing Evaluation Services by Neuropsychologist (1 hour, 50 minutes) 96132 x 1 (first hour), 96133 x 1 (50 minutes)    Definitions:      43136/91635:  Neurobehavioral Status Exam, Clinical interview. Clinical assessment of thinking, reasoning and judgment, by neuropsychologist, both face to face time with patient and time interpreting those test results and reporting, first and subsequent hours)    37818/82570: Neuropsychological Test Administration by Technician/Psychometrist, first 30 minutes and each additional 30 minutes. The above includes: Record review. Review of history provided by patient. Review of collaborative information. Testing by Clinician. Review of raw data. Scoring.  Report writing of individual tests administered by Clinician. Integration of individual tests administered by psychometrist with NSE/testing by clinician, review of records/history/collaborative information, case Conceptualization, treatment planning, clinical decision making, report writing, coordination Of Care. Psychometry test codes as time spent by psychometrist administering and scoring neurocognitive/psychological tests under supervision of neuropsychologist.  Integral services including scoring of raw data, data interpretation, case conceptualization, report writing etcetera were initiated after the patient finished testing/raw data collected and was completed on the date the report was signed.

## 2019-11-12 ENCOUNTER — OFFICE VISIT (OUTPATIENT)
Dept: NEUROLOGY | Age: 79
End: 2019-11-12

## 2019-11-12 DIAGNOSIS — F41.8 ANXIETY ABOUT HEALTH: ICD-10-CM

## 2019-11-12 DIAGNOSIS — G31.84 MILD COGNITIVE IMPAIRMENT: Primary | ICD-10-CM

## 2019-11-12 DIAGNOSIS — R41.840 ATTENTION DEFICIT: ICD-10-CM

## 2019-11-12 DIAGNOSIS — Z81.8 FAMILY HISTORY OF DEMENTIA: ICD-10-CM

## 2019-11-12 NOTE — PROGRESS NOTES
Psychoeducational and supportive psychotherapy and feedback session with the patient today. I reviewed the results of the recent Neuropsychological Evaluation, including discussing individual tests as well as patient's areas of neurocognitive strength versus weakness. Prior to seeing the patient I reviewed the records, including the previously completed report, the records in [place], and any updated visits from other providers since I saw the patient last.      We discussed, in detail, the following: This is a predominantly normal range Neuropsychological Evaluation with respect to neurocognitive functioning. In this regard, the only (and mild) impairment noted was for sustained visual attention. Otherwise, her performances across all other neurocognitive domains assessed, including mental status, confrontation naming, verbal fluency, perceptual reasoning, verbal comprehension, bilateral fine motor dexterity, auditory learning, auditory memory, processing speed, working memory, and executive functioning remain well within the normal range. From an emotional standpoint, there is concern for mild depression and she is anxious about her health, in particular given the family history of dementia.                   Thankfully this profile is not consistent with MCI or dementia. She does have a vascular history raising concern for same but the current profile is more suggestive of a mild/chronic attention deficit issue exacerbated by age, mild depressive features, and anxiety/stress/worry about her health. The attention deficit issue is quite mild and chronic and perhaps not appropriate for treatment at this time, but this can be considered if not medically contraindicated. Supportive psychotherapy is advised on a prn basis. She should be encouraged to remain as mentally, socially, and physically active as possible.   I am not concerned about competency, driving, day-to-day supervision, etc.   Baseline now established. Follow up prn. Clinical correlation is, of course, indicated. I will discuss these findings with the patient when she follows up with me in the near future. A follow up Neuropsychological Evaluation is indicated on a prn basis, especially if there are any cognitive and/or emotional changes.       DIAGNOSES:             The Referral Dx of MCI - IS NOT SUPPORTED                                      Anxiety about health                                      Adjustment Disorder with Mildly Depressed Features                                      ADHD, Inattentive, Mild Chronic         Education was provided regarding my diagnostic impressions, and we discussed treatment plan/options. I also answered numerous questions related to the clinical findings, including discussing various methods to improve cognition and mood. Counseling provided regarding mood and cognition. CBT and supportive psychotherapy techniques were utilized. Supportive/Cognitive Behavioral/Solution Focused psychotherapy provided  Discussed rational versus irrational thinking patterns and their consequences. Discussed healthy/adaptive and unhealthy/maladaptive coping. The patient needs to follow with PCP exam very very reassuring.

## 2025-06-04 ENCOUNTER — HOSPITAL ENCOUNTER (INPATIENT)
Facility: HOSPITAL | Age: 85
LOS: 1 days | Discharge: HOME | DRG: 310 | End: 2025-06-05
Attending: EMERGENCY MEDICINE | Admitting: INTERNAL MEDICINE
Payer: MEDICARE

## 2025-06-04 ENCOUNTER — APPOINTMENT (OUTPATIENT)
Facility: HOSPITAL | Age: 85
DRG: 310 | End: 2025-06-04
Payer: MEDICARE

## 2025-06-04 DIAGNOSIS — I48.91 ATRIAL FIBRILLATION WITH RAPID VENTRICULAR RESPONSE (HCC): Primary | ICD-10-CM

## 2025-06-04 DIAGNOSIS — R42 LIGHT HEADEDNESS: ICD-10-CM

## 2025-06-04 DIAGNOSIS — R07.9 CHEST PAIN, UNSPECIFIED TYPE: ICD-10-CM

## 2025-06-04 DIAGNOSIS — R60.9 SWELLING: ICD-10-CM

## 2025-06-04 DIAGNOSIS — I48.91 ATRIAL FIBRILLATION WITH RVR (HCC): ICD-10-CM

## 2025-06-04 LAB
ALBUMIN SERPL-MCNC: 4 G/DL (ref 3.5–5)
ALBUMIN/GLOB SERPL: 1 (ref 1.1–2.2)
ALP SERPL-CCNC: 81 U/L (ref 45–117)
ALT SERPL-CCNC: 14 U/L (ref 12–78)
ANION GAP SERPL CALC-SCNC: 8 MMOL/L (ref 2–12)
AST SERPL-CCNC: 10 U/L (ref 15–37)
BASOPHILS # BLD: 0.07 K/UL (ref 0–0.1)
BASOPHILS NFR BLD: 0.8 % (ref 0–1)
BILIRUB SERPL-MCNC: 0.2 MG/DL (ref 0.2–1)
BUN SERPL-MCNC: 16 MG/DL (ref 6–20)
BUN/CREAT SERPL: 19 (ref 12–20)
CALCIUM SERPL-MCNC: 9.8 MG/DL (ref 8.5–10.1)
CHLORIDE SERPL-SCNC: 107 MMOL/L (ref 97–108)
CO2 SERPL-SCNC: 26 MMOL/L (ref 21–32)
COMMENT:: NORMAL
CREAT SERPL-MCNC: 0.86 MG/DL (ref 0.55–1.02)
DIFFERENTIAL METHOD BLD: ABNORMAL
EOSINOPHIL # BLD: 0.19 K/UL (ref 0–0.4)
EOSINOPHIL NFR BLD: 2.2 % (ref 0–7)
ERYTHROCYTE [DISTWIDTH] IN BLOOD BY AUTOMATED COUNT: 15.2 % (ref 11.5–14.5)
GLOBULIN SER CALC-MCNC: 4 G/DL (ref 2–4)
GLUCOSE SERPL-MCNC: 126 MG/DL (ref 65–100)
HCT VFR BLD AUTO: 40.3 % (ref 35–47)
HGB BLD-MCNC: 12.7 G/DL (ref 11.5–16)
IMM GRANULOCYTES # BLD AUTO: 0.03 K/UL (ref 0–0.04)
IMM GRANULOCYTES NFR BLD AUTO: 0.3 % (ref 0–0.5)
LYMPHOCYTES # BLD: 2.55 K/UL (ref 0.8–3.5)
LYMPHOCYTES NFR BLD: 29 % (ref 12–49)
MAGNESIUM SERPL-MCNC: 2.1 MG/DL (ref 1.6–2.4)
MCH RBC QN AUTO: 28.2 PG (ref 26–34)
MCHC RBC AUTO-ENTMCNC: 31.5 G/DL (ref 30–36.5)
MCV RBC AUTO: 89.4 FL (ref 80–99)
MONOCYTES # BLD: 0.97 K/UL (ref 0–1)
MONOCYTES NFR BLD: 11 % (ref 5–13)
NEUTS SEG # BLD: 4.99 K/UL (ref 1.8–8)
NEUTS SEG NFR BLD: 56.7 % (ref 32–75)
NRBC # BLD: 0 K/UL (ref 0–0.01)
NRBC BLD-RTO: 0 PER 100 WBC
PLATELET # BLD AUTO: 188 K/UL (ref 150–400)
PMV BLD AUTO: 11.1 FL (ref 8.9–12.9)
POTASSIUM SERPL-SCNC: 3.9 MMOL/L (ref 3.5–5.1)
PROT SERPL-MCNC: 8 G/DL (ref 6.4–8.2)
RBC # BLD AUTO: 4.51 M/UL (ref 3.8–5.2)
SODIUM SERPL-SCNC: 141 MMOL/L (ref 136–145)
SPECIMEN HOLD: NORMAL
TROPONIN I SERPL HS-MCNC: 10 NG/L (ref 0–51)
WBC # BLD AUTO: 8.8 K/UL (ref 3.6–11)

## 2025-06-04 PROCEDURE — 80053 COMPREHEN METABOLIC PANEL: CPT

## 2025-06-04 PROCEDURE — 83735 ASSAY OF MAGNESIUM: CPT

## 2025-06-04 PROCEDURE — 99285 EMERGENCY DEPT VISIT HI MDM: CPT

## 2025-06-04 PROCEDURE — 1100000000 HC RM PRIVATE

## 2025-06-04 PROCEDURE — 84484 ASSAY OF TROPONIN QUANT: CPT

## 2025-06-04 PROCEDURE — 96374 THER/PROPH/DIAG INJ IV PUSH: CPT

## 2025-06-04 PROCEDURE — 93005 ELECTROCARDIOGRAM TRACING: CPT | Performed by: EMERGENCY MEDICINE

## 2025-06-04 PROCEDURE — 2580000003 HC RX 258: Performed by: EMERGENCY MEDICINE

## 2025-06-04 PROCEDURE — 85025 COMPLETE CBC W/AUTO DIFF WBC: CPT

## 2025-06-04 PROCEDURE — 2500000003 HC RX 250 WO HCPCS: Performed by: EMERGENCY MEDICINE

## 2025-06-04 PROCEDURE — 71045 X-RAY EXAM CHEST 1 VIEW: CPT

## 2025-06-04 PROCEDURE — 36415 COLL VENOUS BLD VENIPUNCTURE: CPT

## 2025-06-04 RX ORDER — ONDANSETRON 2 MG/ML
4 INJECTION INTRAMUSCULAR; INTRAVENOUS EVERY 6 HOURS PRN
Status: DISCONTINUED | OUTPATIENT
Start: 2025-06-04 | End: 2025-06-05 | Stop reason: HOSPADM

## 2025-06-04 RX ORDER — ACETAMINOPHEN 325 MG/1
650 TABLET ORAL EVERY 6 HOURS PRN
Status: DISCONTINUED | OUTPATIENT
Start: 2025-06-04 | End: 2025-06-05 | Stop reason: HOSPADM

## 2025-06-04 RX ORDER — OMEPRAZOLE 20 MG/1
CAPSULE, DELAYED RELEASE ORAL
COMMUNITY
Start: 2025-05-27

## 2025-06-04 RX ORDER — MAGNESIUM SULFATE IN WATER 40 MG/ML
2000 INJECTION, SOLUTION INTRAVENOUS PRN
Status: DISCONTINUED | OUTPATIENT
Start: 2025-06-04 | End: 2025-06-05 | Stop reason: HOSPADM

## 2025-06-04 RX ORDER — LOSARTAN POTASSIUM 50 MG/1
50 TABLET ORAL DAILY
COMMUNITY
Start: 2025-03-16

## 2025-06-04 RX ORDER — SODIUM CHLORIDE 0.9 % (FLUSH) 0.9 %
5-40 SYRINGE (ML) INJECTION PRN
Status: DISCONTINUED | OUTPATIENT
Start: 2025-06-04 | End: 2025-06-05 | Stop reason: HOSPADM

## 2025-06-04 RX ORDER — ONDANSETRON 4 MG/1
4 TABLET, ORALLY DISINTEGRATING ORAL EVERY 8 HOURS PRN
Status: DISCONTINUED | OUTPATIENT
Start: 2025-06-04 | End: 2025-06-05 | Stop reason: HOSPADM

## 2025-06-04 RX ORDER — CLOPIDOGREL BISULFATE 75 MG/1
75 TABLET ORAL DAILY
Status: ON HOLD | COMMUNITY
Start: 2025-05-30 | End: 2025-06-05 | Stop reason: HOSPADM

## 2025-06-04 RX ORDER — SODIUM CHLORIDE 0.9 % (FLUSH) 0.9 %
5-40 SYRINGE (ML) INJECTION EVERY 12 HOURS SCHEDULED
Status: DISCONTINUED | OUTPATIENT
Start: 2025-06-04 | End: 2025-06-05 | Stop reason: HOSPADM

## 2025-06-04 RX ORDER — DILTIAZEM HYDROCHLORIDE 5 MG/ML
10 INJECTION INTRAVENOUS ONCE
Status: COMPLETED | OUTPATIENT
Start: 2025-06-04 | End: 2025-06-04

## 2025-06-04 RX ORDER — AMLODIPINE BESYLATE 5 MG/1
5 TABLET ORAL DAILY
Status: ON HOLD | COMMUNITY
End: 2025-06-05 | Stop reason: HOSPADM

## 2025-06-04 RX ORDER — FLUTICASONE FUROATE, UMECLIDINIUM BROMIDE AND VILANTEROL TRIFENATATE 100; 62.5; 25 UG/1; UG/1; UG/1
POWDER RESPIRATORY (INHALATION)
COMMUNITY
Start: 2025-05-12

## 2025-06-04 RX ORDER — POTASSIUM CHLORIDE 7.45 MG/ML
10 INJECTION INTRAVENOUS PRN
Status: DISCONTINUED | OUTPATIENT
Start: 2025-06-04 | End: 2025-06-05 | Stop reason: HOSPADM

## 2025-06-04 RX ORDER — ACETAMINOPHEN 650 MG/1
650 SUPPOSITORY RECTAL EVERY 6 HOURS PRN
Status: DISCONTINUED | OUTPATIENT
Start: 2025-06-04 | End: 2025-06-05 | Stop reason: HOSPADM

## 2025-06-04 RX ORDER — SODIUM CHLORIDE 9 MG/ML
INJECTION, SOLUTION INTRAVENOUS PRN
Status: DISCONTINUED | OUTPATIENT
Start: 2025-06-04 | End: 2025-06-05 | Stop reason: HOSPADM

## 2025-06-04 RX ORDER — POLYETHYLENE GLYCOL 3350 17 G/17G
17 POWDER, FOR SOLUTION ORAL DAILY PRN
Status: DISCONTINUED | OUTPATIENT
Start: 2025-06-04 | End: 2025-06-05 | Stop reason: HOSPADM

## 2025-06-04 RX ORDER — FAMOTIDINE 40 MG/1
40 TABLET, FILM COATED ORAL NIGHTLY
Status: ON HOLD | COMMUNITY
Start: 2025-05-22 | End: 2025-06-05 | Stop reason: HOSPADM

## 2025-06-04 RX ORDER — POTASSIUM CHLORIDE 750 MG/1
40 TABLET, EXTENDED RELEASE ORAL PRN
Status: DISCONTINUED | OUTPATIENT
Start: 2025-06-04 | End: 2025-06-05 | Stop reason: HOSPADM

## 2025-06-04 RX ADMIN — DILTIAZEM HYDROCHLORIDE 10 MG: 5 INJECTION, SOLUTION INTRAVENOUS at 22:17

## 2025-06-04 RX ADMIN — SODIUM CHLORIDE 5 MG/HR: 900 INJECTION, SOLUTION INTRAVENOUS at 22:24

## 2025-06-05 ENCOUNTER — APPOINTMENT (OUTPATIENT)
Facility: HOSPITAL | Age: 85
DRG: 310 | End: 2025-06-05
Attending: INTERNAL MEDICINE
Payer: MEDICARE

## 2025-06-05 ENCOUNTER — APPOINTMENT (OUTPATIENT)
Dept: VASCULAR SURGERY | Facility: HOSPITAL | Age: 85
DRG: 310 | End: 2025-06-05
Attending: INTERNAL MEDICINE
Payer: MEDICARE

## 2025-06-05 VITALS
WEIGHT: 178 LBS | RESPIRATION RATE: 17 BRPM | DIASTOLIC BLOOD PRESSURE: 63 MMHG | TEMPERATURE: 98.4 F | OXYGEN SATURATION: 98 % | HEIGHT: 64 IN | BODY MASS INDEX: 30.39 KG/M2 | SYSTOLIC BLOOD PRESSURE: 131 MMHG | HEART RATE: 69 BPM

## 2025-06-05 PROBLEM — I48.91 ATRIAL FIBRILLATION WITH RAPID VENTRICULAR RESPONSE (HCC): Status: ACTIVE | Noted: 2025-06-05

## 2025-06-05 LAB
ANION GAP SERPL CALC-SCNC: 5 MMOL/L (ref 2–12)
BASOPHILS # BLD: 0.06 K/UL (ref 0–0.1)
BASOPHILS NFR BLD: 0.7 % (ref 0–1)
BUN SERPL-MCNC: 14 MG/DL (ref 6–20)
BUN/CREAT SERPL: 20 (ref 12–20)
CALCIUM SERPL-MCNC: 9.7 MG/DL (ref 8.5–10.1)
CHLORIDE SERPL-SCNC: 113 MMOL/L (ref 97–108)
CO2 SERPL-SCNC: 25 MMOL/L (ref 21–32)
CREAT SERPL-MCNC: 0.7 MG/DL (ref 0.55–1.02)
D DIMER PPP FEU-MCNC: 0.42 MG/L FEU (ref 0–0.65)
DIFFERENTIAL METHOD BLD: ABNORMAL
ECHO AO ARCH DIAM: 2.2 CM
ECHO AO ASC DIAM: 3.3 CM
ECHO AO ASCENDING AORTA INDEX: 1.77 CM/M2
ECHO AO ROOT DIAM: 3.6 CM
ECHO AO ROOT INDEX: 1.94 CM/M2
ECHO AV AREA PEAK VELOCITY: 2.5 CM2
ECHO AV AREA VTI: 2.7 CM2
ECHO AV AREA/BSA PEAK VELOCITY: 1.3 CM2/M2
ECHO AV AREA/BSA VTI: 1.5 CM2/M2
ECHO AV MEAN GRADIENT: 4 MMHG
ECHO AV MEAN VELOCITY: 0.9 M/S
ECHO AV PEAK GRADIENT: 8 MMHG
ECHO AV PEAK VELOCITY: 1.4 M/S
ECHO AV VELOCITY RATIO: 0.57
ECHO AV VTI: 29.5 CM
ECHO BSA: 1.91 M2
ECHO EST RA PRESSURE: 3 MMHG
ECHO LA DIAMETER INDEX: 2.04 CM/M2
ECHO LA DIAMETER: 3.8 CM
ECHO LA TO AORTIC ROOT RATIO: 1.06
ECHO LA VOL A-L A2C: 35 ML (ref 22–52)
ECHO LA VOL A-L A4C: 49 ML (ref 22–52)
ECHO LA VOL BP: 39 ML (ref 22–52)
ECHO LA VOL MOD A2C: 34 ML (ref 22–52)
ECHO LA VOL MOD A4C: 45 ML (ref 22–52)
ECHO LA VOL/BSA BIPLANE: 21 ML/M2 (ref 16–34)
ECHO LA VOLUME AREA LENGTH: 41 ML
ECHO LA VOLUME INDEX A-L A2C: 19 ML/M2 (ref 16–34)
ECHO LA VOLUME INDEX A-L A4C: 26 ML/M2 (ref 16–34)
ECHO LA VOLUME INDEX AREA LENGTH: 22 ML/M2 (ref 16–34)
ECHO LA VOLUME INDEX MOD A2C: 18 ML/M2 (ref 16–34)
ECHO LA VOLUME INDEX MOD A4C: 24 ML/M2 (ref 16–34)
ECHO LV E' LATERAL VELOCITY: 6.15 CM/S
ECHO LV E' SEPTAL VELOCITY: 4.16 CM/S
ECHO LV EDV A2C: 36 ML
ECHO LV EDV A4C: 47 ML
ECHO LV EDV BP: 46 ML (ref 56–104)
ECHO LV EDV INDEX A4C: 25 ML/M2
ECHO LV EDV INDEX BP: 25 ML/M2
ECHO LV EDV NDEX A2C: 19 ML/M2
ECHO LV EF PHYSICIAN: 55 %
ECHO LV EJECTION FRACTION A2C: 44 %
ECHO LV EJECTION FRACTION A4C: 60 %
ECHO LV ESV A2C: 20 ML
ECHO LV ESV A4C: 19 ML
ECHO LV ESV BP: 20 ML (ref 19–49)
ECHO LV ESV INDEX A2C: 11 ML/M2
ECHO LV ESV INDEX A4C: 10 ML/M2
ECHO LV ESV INDEX BP: 11 ML/M2
ECHO LV FRACTIONAL SHORTENING: 29 % (ref 28–44)
ECHO LV INTERNAL DIMENSION DIASTOLE INDEX: 1.88 CM/M2
ECHO LV INTERNAL DIMENSION DIASTOLIC: 3.5 CM (ref 3.9–5.3)
ECHO LV INTERNAL DIMENSION SYSTOLIC INDEX: 1.34 CM/M2
ECHO LV INTERNAL DIMENSION SYSTOLIC: 2.5 CM
ECHO LV IVSD: 1.2 CM (ref 0.6–0.9)
ECHO LV MASS 2D: 127.3 G (ref 67–162)
ECHO LV MASS INDEX 2D: 68.4 G/M2 (ref 43–95)
ECHO LV POSTERIOR WALL DIASTOLIC: 1.1 CM (ref 0.6–0.9)
ECHO LV RELATIVE WALL THICKNESS RATIO: 0.63
ECHO LVOT AREA: 4.2 CM2
ECHO LVOT AV VTI INDEX: 0.66
ECHO LVOT DIAM: 2.3 CM
ECHO LVOT MEAN GRADIENT: 1 MMHG
ECHO LVOT PEAK GRADIENT: 3 MMHG
ECHO LVOT PEAK VELOCITY: 0.8 M/S
ECHO LVOT STROKE VOLUME INDEX: 43.3 ML/M2
ECHO LVOT SV: 80.6 ML
ECHO LVOT VTI: 19.4 CM
ECHO MV A VELOCITY: 0.8 M/S
ECHO MV AREA VTI: 3.3 CM2
ECHO MV E DECELERATION TIME (DT): 258.2 MS
ECHO MV E VELOCITY: 0.49 M/S
ECHO MV E/A RATIO: 0.61
ECHO MV E/E' LATERAL: 7.97
ECHO MV E/E' RATIO (AVERAGED): 9.87
ECHO MV E/E' SEPTAL: 11.78
ECHO MV LVOT VTI INDEX: 1.26
ECHO MV MAX VELOCITY: 1 M/S
ECHO MV MEAN GRADIENT: 1 MMHG
ECHO MV MEAN VELOCITY: 0.5 M/S
ECHO MV PEAK GRADIENT: 4 MMHG
ECHO MV VTI: 24.5 CM
ECHO PV MAX VELOCITY: 0.9 M/S
ECHO PV PEAK GRADIENT: 3 MMHG
ECHO RIGHT VENTRICULAR SYSTOLIC PRESSURE (RVSP): 24 MMHG
ECHO RV FREE WALL PEAK S': 10.2 CM/S
ECHO RV INTERNAL DIMENSION: 2.2 CM
ECHO RV TAPSE: 2.4 CM (ref 1.7–?)
ECHO TV REGURGITANT MAX VELOCITY: 2.27 M/S
ECHO TV REGURGITANT PEAK GRADIENT: 21 MMHG
EKG DIAGNOSIS: NORMAL
EKG Q-T INTERVAL: 286 MS
EKG QRS DURATION: 88 MS
EKG QTC CALCULATION (BAZETT): 425 MS
EKG R AXIS: 17 DEGREES
EKG T AXIS: 145 DEGREES
EKG VENTRICULAR RATE: 133 BPM
EOSINOPHIL # BLD: 0.16 K/UL (ref 0–0.4)
EOSINOPHIL NFR BLD: 2 % (ref 0–7)
ERYTHROCYTE [DISTWIDTH] IN BLOOD BY AUTOMATED COUNT: 14.9 % (ref 11.5–14.5)
GLUCOSE SERPL-MCNC: 105 MG/DL (ref 65–100)
HCT VFR BLD AUTO: 39.6 % (ref 35–47)
HGB BLD-MCNC: 12.4 G/DL (ref 11.5–16)
IMM GRANULOCYTES # BLD AUTO: 0.03 K/UL (ref 0–0.04)
IMM GRANULOCYTES NFR BLD AUTO: 0.4 % (ref 0–0.5)
LYMPHOCYTES # BLD: 2.28 K/UL (ref 0.8–3.5)
LYMPHOCYTES NFR BLD: 28.1 % (ref 12–49)
MCH RBC QN AUTO: 29 PG (ref 26–34)
MCHC RBC AUTO-ENTMCNC: 31.3 G/DL (ref 30–36.5)
MCV RBC AUTO: 92.5 FL (ref 80–99)
MONOCYTES # BLD: 0.71 K/UL (ref 0–1)
MONOCYTES NFR BLD: 8.7 % (ref 5–13)
NEUTS SEG # BLD: 4.88 K/UL (ref 1.8–8)
NEUTS SEG NFR BLD: 60.1 % (ref 32–75)
NRBC # BLD: 0 K/UL (ref 0–0.01)
NRBC BLD-RTO: 0 PER 100 WBC
NT PRO BNP: 407 PG/ML
PLATELET # BLD AUTO: 199 K/UL (ref 150–400)
PMV BLD AUTO: 11 FL (ref 8.9–12.9)
POTASSIUM SERPL-SCNC: 4 MMOL/L (ref 3.5–5.1)
RBC # BLD AUTO: 4.28 M/UL (ref 3.8–5.2)
SODIUM SERPL-SCNC: 143 MMOL/L (ref 136–145)
T4 FREE SERPL-MCNC: 1 NG/DL (ref 0.8–1.5)
TROPONIN I SERPL HS-MCNC: 23 NG/L (ref 0–51)
TROPONIN I SERPL HS-MCNC: 23 NG/L (ref 0–51)
TSH SERPL DL<=0.05 MIU/L-ACNC: 2.79 UIU/ML (ref 0.36–3.74)
WBC # BLD AUTO: 8.1 K/UL (ref 3.6–11)

## 2025-06-05 PROCEDURE — 94761 N-INVAS EAR/PLS OXIMETRY MLT: CPT

## 2025-06-05 PROCEDURE — 84439 ASSAY OF FREE THYROXINE: CPT

## 2025-06-05 PROCEDURE — 36415 COLL VENOUS BLD VENIPUNCTURE: CPT

## 2025-06-05 PROCEDURE — 85025 COMPLETE CBC W/AUTO DIFF WBC: CPT

## 2025-06-05 PROCEDURE — 93010 ELECTROCARDIOGRAM REPORT: CPT | Performed by: SPECIALIST

## 2025-06-05 PROCEDURE — 2500000003 HC RX 250 WO HCPCS: Performed by: INTERNAL MEDICINE

## 2025-06-05 PROCEDURE — 6360000002 HC RX W HCPCS: Performed by: STUDENT IN AN ORGANIZED HEALTH CARE EDUCATION/TRAINING PROGRAM

## 2025-06-05 PROCEDURE — 84484 ASSAY OF TROPONIN QUANT: CPT

## 2025-06-05 PROCEDURE — 93306 TTE W/DOPPLER COMPLETE: CPT

## 2025-06-05 PROCEDURE — 99222 1ST HOSP IP/OBS MODERATE 55: CPT | Performed by: SPECIALIST

## 2025-06-05 PROCEDURE — 6360000002 HC RX W HCPCS: Performed by: INTERNAL MEDICINE

## 2025-06-05 PROCEDURE — APPSS30 APP SPLIT SHARED TIME 16-30 MINUTES: Performed by: NURSE PRACTITIONER

## 2025-06-05 PROCEDURE — 94640 AIRWAY INHALATION TREATMENT: CPT

## 2025-06-05 PROCEDURE — 6370000000 HC RX 637 (ALT 250 FOR IP): Performed by: SPECIALIST

## 2025-06-05 PROCEDURE — 93306 TTE W/DOPPLER COMPLETE: CPT | Performed by: SPECIALIST

## 2025-06-05 PROCEDURE — 6370000000 HC RX 637 (ALT 250 FOR IP): Performed by: STUDENT IN AN ORGANIZED HEALTH CARE EDUCATION/TRAINING PROGRAM

## 2025-06-05 PROCEDURE — 84443 ASSAY THYROID STIM HORMONE: CPT

## 2025-06-05 PROCEDURE — 85379 FIBRIN DEGRADATION QUANT: CPT

## 2025-06-05 PROCEDURE — 80048 BASIC METABOLIC PNL TOTAL CA: CPT

## 2025-06-05 PROCEDURE — 6370000000 HC RX 637 (ALT 250 FOR IP): Performed by: INTERNAL MEDICINE

## 2025-06-05 PROCEDURE — 83880 ASSAY OF NATRIURETIC PEPTIDE: CPT

## 2025-06-05 RX ORDER — DILTIAZEM HYDROCHLORIDE 240 MG/1
240 CAPSULE, COATED, EXTENDED RELEASE ORAL DAILY
Status: DISCONTINUED | OUTPATIENT
Start: 2025-06-05 | End: 2025-06-05 | Stop reason: HOSPADM

## 2025-06-05 RX ORDER — ARFORMOTEROL TARTRATE 15 UG/2ML
15 SOLUTION RESPIRATORY (INHALATION)
Status: DISCONTINUED | OUTPATIENT
Start: 2025-06-05 | End: 2025-06-05 | Stop reason: HOSPADM

## 2025-06-05 RX ORDER — FAMOTIDINE 20 MG/1
40 TABLET, FILM COATED ORAL NIGHTLY
Status: DISCONTINUED | OUTPATIENT
Start: 2025-06-05 | End: 2025-06-05 | Stop reason: HOSPADM

## 2025-06-05 RX ORDER — BUDESONIDE 0.5 MG/2ML
0.25 INHALANT ORAL
Status: DISCONTINUED | OUTPATIENT
Start: 2025-06-05 | End: 2025-06-05 | Stop reason: HOSPADM

## 2025-06-05 RX ORDER — ENOXAPARIN SODIUM 100 MG/ML
40 INJECTION SUBCUTANEOUS DAILY
Status: DISCONTINUED | OUTPATIENT
Start: 2025-06-05 | End: 2025-06-05 | Stop reason: ALTCHOICE

## 2025-06-05 RX ORDER — CLOPIDOGREL BISULFATE 75 MG/1
75 TABLET ORAL DAILY
Status: DISCONTINUED | OUTPATIENT
Start: 2025-06-05 | End: 2025-06-05

## 2025-06-05 RX ORDER — PANTOPRAZOLE SODIUM 40 MG/1
40 TABLET, DELAYED RELEASE ORAL
Status: DISCONTINUED | OUTPATIENT
Start: 2025-06-05 | End: 2025-06-05 | Stop reason: HOSPADM

## 2025-06-05 RX ORDER — AMLODIPINE BESYLATE 5 MG/1
5 TABLET ORAL DAILY
Status: DISCONTINUED | OUTPATIENT
Start: 2025-06-05 | End: 2025-06-05

## 2025-06-05 RX ORDER — DILTIAZEM HYDROCHLORIDE 240 MG/1
240 CAPSULE, COATED, EXTENDED RELEASE ORAL DAILY
Qty: 30 CAPSULE | Refills: 1 | Status: SHIPPED | OUTPATIENT
Start: 2025-06-05 | End: 2025-08-04

## 2025-06-05 RX ORDER — LOSARTAN POTASSIUM 50 MG/1
50 TABLET ORAL DAILY
Status: DISCONTINUED | OUTPATIENT
Start: 2025-06-05 | End: 2025-06-05 | Stop reason: HOSPADM

## 2025-06-05 RX ADMIN — BUDESONIDE 250 MCG: 0.5 INHALANT RESPIRATORY (INHALATION) at 07:36

## 2025-06-05 RX ADMIN — AMLODIPINE BESYLATE 5 MG: 5 TABLET ORAL at 11:48

## 2025-06-05 RX ADMIN — DILTIAZEM HYDROCHLORIDE 240 MG: 240 CAPSULE, EXTENDED RELEASE ORAL at 16:35

## 2025-06-05 RX ADMIN — PANTOPRAZOLE SODIUM 40 MG: 40 TABLET, DELAYED RELEASE ORAL at 06:37

## 2025-06-05 RX ADMIN — SODIUM CHLORIDE, PRESERVATIVE FREE 10 ML: 5 INJECTION INTRAVENOUS at 08:25

## 2025-06-05 RX ADMIN — IPRATROPIUM BROMIDE 0.5 MG: 0.5 SOLUTION RESPIRATORY (INHALATION) at 07:36

## 2025-06-05 RX ADMIN — ARFORMOTEROL TARTRATE 15 MCG: 15 SOLUTION RESPIRATORY (INHALATION) at 07:36

## 2025-06-05 RX ADMIN — CLOPIDOGREL BISULFATE 75 MG: 75 TABLET, FILM COATED ORAL at 08:26

## 2025-06-05 RX ADMIN — ENOXAPARIN SODIUM 40 MG: 100 INJECTION SUBCUTANEOUS at 14:40

## 2025-06-05 RX ADMIN — IPRATROPIUM BROMIDE 0.5 MG: 0.5 SOLUTION RESPIRATORY (INHALATION) at 13:16

## 2025-06-05 RX ADMIN — LOSARTAN POTASSIUM 50 MG: 50 TABLET, FILM COATED ORAL at 08:26

## 2025-06-05 ASSESSMENT — ENCOUNTER SYMPTOMS: CHEST TIGHTNESS: 1

## 2025-06-05 NOTE — PROGRESS NOTES
Hospitalist Progress Note      NAME:  Corrie Gordon   :  1940  MRM:  [id number]    Date/Time: 2025  1:21 PM           Assessment / Plan:     Ms. Gordon is a 85 y.o.  female with a past medical history of atrial fibrillation, hypertension, peripheral artery disease who is admitted with atrial fibrillation with RVR.     Atrial fibrillation with RVR (HCC) POA: Was started on IV dill drip, converted to sinus.  Cardiology consulted.  Echo pending.  TSH within goal.  Continue to monitor on telemetry.    Chest pain , Lightheadedness POA: Possibly due to above.  Troponins negative.  Echo pending.  Scheduled for carotid ultrasound as an outpatient.  It has been ordered and pending at this time.  Cardiology following.     Lower extremity swelling: D-dimer unremarkable.     Hypertension:   Hold home amlodipine given Cardizem drip  Continue losartan as tolerated     COPD  Does not appear in acute exacerbation  Continue nebs     History of hydrocephalus status post  shunt  Follow-up outpatient     History of peptic ulcer disease  Continue Pepcid  Continue PPI    #BMI (Calculated): 30.67    I have personally reviewed the radiographs, laboratory data in Epic and decisions and statements above are based partially on this personal interpretation.                 Care Plan discussed with: Patient, Family, Care Manager, and Nursing Staff    Discussed:  Care Plan    Prophylaxis:  Lovenox    Disposition:  Home w/Family           ___________________________________________________    Attending Physician: Mariya Fiore MD        Subjective:     Chief Complaint: Chest pain  Patient is currently chest pain-free.  Denied any shortness of breath, palpitations, dizziness.  Off Cardizem drip.       Objective:       Vitals:          Last 24hrs VS reviewed since prior progress note. Most recent are:    Vitals:    25 1316   BP:    Pulse: 75   Resp: 17   Temp:    SpO2: 96%     SpO2 Readings from Last 6 Encounters:   25  IntraVENous PRN    0.9 % sodium chloride infusion   IntraVENous PRN    potassium chloride (KLOR-CON) extended release tablet 40 mEq  40 mEq Oral PRN    Or    potassium bicarb-citric acid (EFFER-K) effervescent tablet 40 mEq  40 mEq Oral PRN    Or    potassium chloride 10 mEq/100 mL IVPB (Peripheral Line)  10 mEq IntraVENous PRN    magnesium sulfate 2000 mg in 50 mL IVPB premix  2,000 mg IntraVENous PRN    ondansetron (ZOFRAN-ODT) disintegrating tablet 4 mg  4 mg Oral Q8H PRN    Or    ondansetron (ZOFRAN) injection 4 mg  4 mg IntraVENous Q6H PRN    polyethylene glycol (GLYCOLAX) packet 17 g  17 g Oral Daily PRN    acetaminophen (TYLENOL) tablet 650 mg  650 mg Oral Q6H PRN    Or    acetaminophen (TYLENOL) suppository 650 mg  650 mg Rectal Q6H PRN            Lab Review:     Recent Labs     06/04/25 2209 06/05/25  0216   WBC 8.8 8.1   HGB 12.7 12.4   HCT 40.3 39.6    199     Recent Labs     06/04/25 2209 06/05/25  0216    143   K 3.9 4.0    113*   CO2 26 25   BUN 16 14   MG 2.1  --    ALT 14  --      No components found for: \"GLPOC\"

## 2025-06-05 NOTE — CONSULTS
[facility] CARDIOLOGY                    Cardiology Care Note     [x]Initial Encounter     []Follow-up    Patient Name: Corrie Gordon - :1940 - MRN:[id number]  Primary Cardiologist: Dr. Xiao  Consulting Cardiologist: Daisy Meraz MD     Reason for encounter: A-fib w/ RVR     HPI:       Corrie Gordon is a 85 y.o. female with PMH significant for PAF recent diagnosis, COPD, HTN, PAD and PUD.     Pt presented to the ED due to complaints of CP, described as a pressure, and associated with SOB. Started last night. She was diagnosed with a-fib about 2 weeks ago and had similar symptoms but were worse then. On admission, found to be in a-fib w/ RVR but has since converted to SR with diltiazem. She reports she is not on any rate controlling meds but Dr. Xiao started plavix for her a-fib by her report. Denies any other cardiac hx.     Subjective:      Corrie Gordon reports none.     Assessment and Plan     A-fib w/ RVR, recent PAF diagnosis  - converted after IV dilt  - recommend to start PO dilt and d/t norvasc  - TTE pending  - TSH 2.79   - unclear why just on plavix and not OAC, will obtain records  - CHADS2 Vasc score of 3    2. HTN  - adding dilt per above  - cont losartan     3. COPD on trelegy PTA  - not in exacerbation  - former smoker      CARDIOLOGY ATTENDING  Patient personally seen and examined. All the elements of history and examination were personally performed. Assessment and plan was discussed and agree.      Feels fine now   NAD, Hrt RRR, lungs clear, abd soft, no sig edema    PAF  - presented in Afib with RVR --- spont converted to sinus   --> stop norvasc and start Diltiazem 240 mg daily   --> Stop plavix and start Eliquis 5 mg BID (reviewed risks and benefits)     HTN  - see CCB change above - can cont OP losartan     I will sign off - FU with Dr. Dameon Meraz MD, FACC          ____________________________________________________________    Cardiac testing  No

## 2025-06-05 NOTE — PROGRESS NOTES
Patient arrived via EMS from [place] ED. Connected patient to machine to obtain vitals and the HR read 59. All other vss, did not re-connect Cardizem drip. Dr Richey walked into patient's room. This RN notified Dr Richey of current situation. Dr Richey stated to not re-connect the Cardizem, and an EKG was not necessary at this time since it appeared the patient was in normal sinus rhythm/sinus bradycardia.

## 2025-06-05 NOTE — ED NOTES
TRANSFER - OUT REPORT:    Verbal report given to samantha Ceja on Corrie Gordon  being transferred to [facility]/ 320 for routine progression of patient care       Report consisted of patient's Situation, Background, Assessment and   Recommendations(SBAR).     Information from the following report(s) Nurse Handoff Report, Index, ED Encounter Summary, ED SBAR, MAR, and Recent Results was reviewed with the receiving nurse.    [place] Fall Assessment:    Presents to emergency department  because of falls (Syncope, seizure, or loss of consciousness): No  Age > 70: Yes  Altered Mental Status, Intoxication with alcohol or substance confusion (Disorientation, impaired judgment, poor safety awaremess, or inability to follow instructions): No  Impaired Mobility: Ambulates or transfers with assistive devices or assistance; Unable to ambulate or transer.: No  Nursing Judgement: Yes          Lines:   Peripheral IV 06/04/25 Left Antecubital (Active)        Opportunity for questions and clarification was provided.      Patient transported with:  Monitor, 20g in lft ac with diltizem infusing at 10mg.

## 2025-06-05 NOTE — CARE COORDINATION
11:48 AM  06/05/25 06/05/25 1146   Service Assessment   Patient Orientation Alert and Oriented   Cognition Alert   History Provided By Medical Record   Primary Caregiver Self   Support Systems Spouse/Significant Other   Patient's Healthcare Decision Maker is: Legal Next of Kin   PCP Verified by CM Yes   Last Visit to PCP Within last 6 months   Prior Functional Level Independent in ADLs/IADLs   Current Functional Level Independent in ADLs/IADLs   Can patient return to prior living arrangement Yes   Ability to make needs known: Good   Family able to assist with home care needs: Yes   Would you like for me to discuss the discharge plan with any other family members/significant others, and if so, who? No   Financial Resources Medicare   Community Resources None   Social/Functional History   Lives With Family   Type of Home House   Prior Level of Assist for ADLs Independent   Prior Level of Assist for Homemaking Independent   Ambulation Assistance Independent   Prior Level of Assist for Transfers Independent   Discharge Planning   Type of Residence House   Living Arrangements Family Members   Current Services Prior To Admission None   Potential Assistance Needed N/A   Potential Assistance Purchasing Medications No   Type of Home Care Services None   Patient expects to be discharged to: House   Services At/After Discharge    Resource Information Provided? No   Confirm Follow Up Transport Family         ICD-10-CM    1. Atrial fibrillation with rapid ventricular response (HCC)  I48.91       2. Chest pain, unspecified type  R07.9 Echo (TTE) complete (PRN contrast/bubble/strain/3D)     Echo (TTE) complete (PRN contrast/bubble/strain/3D)      3. Atrial fibrillation with RVR (HCC)  I48.91 Echo (TTE) complete (PRN contrast/bubble/strain/3D)     Echo (TTE) complete (PRN contrast/bubble/strain/3D)      4. Light headedness  R42 Vascular duplex carotid bilateral     Vascular duplex carotid bilateral      5. Swelling

## 2025-06-05 NOTE — PROGRESS NOTES
Hospitalist Discharge Summary     Patient ID:  Corrie Gordon  [id number]  85 y.o.  1940    Admit date: 6/4/2025    Discharge date and time: 6/5/2025    Admission Diagnoses: Atrial fibrillation with rapid ventricular response (HCC) [I48.91]  Atrial fibrillation with RVR (HCC) [I48.91]  Chest pain, unspecified type [R07.9]    Discharge Diagnoses:    Principal Problem:    Atrial fibrillation with RVR (HCC)  Active Problems:    Atrial fibrillation with rapid ventricular response (HCC)  Resolved Problems:    * No resolved hospital problems. *         Hospital Course:     Ms. Gordon is a 85 y.o.  female with a past medical history of atrial fibrillation, hypertension, peripheral artery disease who is admitted with atrial fibrillation with RVR.      Atrial fibrillation with RVR (HCC) POA: Was started on IV dill drip, converted to sinus.  Cardiology consulted.  Echo showed EF 55%, grade I diastolic dysfunction.  TSH within goal. Per cardio , stop norvasc and start Diltiazem 240 mg daily , Stop plavix and start Eliquis 5 mg BID (reviewed risks and benefits) . FU with Dr. Xiao      Chest pain , Lightheadedness POA: Possibly due to above.  Troponins negative.  Echo as above.  Scheduled for carotid ultrasound as an outpatient.       Lower extremity swelling: D-dimer unremarkable.     Hypertension: stopped amolodipine , started on diltiazem, continue losartan     COPD: Does not appear in acute exacerbation     History of hydrocephalus status post  shunt: Follow-up outpatient     History of peptic ulcer disease: Continue PPI      Imaging  XR CHEST PORTABLE  Result Date: 6/4/2025  Minimal lower lung atelectasis or scarring. No other acute process. Electronically signed by Cuba Sequeira       PCP: Graham Lee MD     Consults: cardiology    Condition of patient at discharge: Improved and Stable    Discharge Exam:    Physical Exam:    Gen: Well-developed, well-nourished, in no acute distress  HEENT:  Pink  100-62.5-25 MCG/ACT AEPB inhaler TAKE 1 PUFF EVERY DAY      losartan (COZAAR) 50 MG tablet Take 1 tablet by mouth daily      omeprazole (PRILOSEC) 20 MG delayed release capsule TAKE ONE CAPSULE BY MOUTH ONE-HALF TO ONE HOUR BEFORE MORNING MEAL ONCE DAILY              Activity: activity as tolerated  Diet: cardiac diet  Wound Care: none needed    Follow-up with Graham Lee MD in 2 weeks.  Follow-up tests/labs : None    Approximate time spent in patient care on day of discharge: 35 mins    Signed:  Mariya Fiore MD  6/5/2025  4:19 PM

## 2025-06-05 NOTE — PROGRESS NOTES
Bedside and Verbal shift change report given to Yen Rowland RN (oncoming nurse) by Brenna RN (offgoing nurse). Report included the following information Nurse Handoff Report, Intake/Output, MAR, Recent Results, Cardiac Rhythm NSR - Sinus [place], Neuro Assessment, and Event Log.     Notified provider of elevated /70. New orders received, see MAR    1230 Echo at bedside.    Discharge instructions reviewed with patient and family. Questions answered, pt verbalized understanding.

## 2025-06-05 NOTE — H&P
[facility]  [address] [place] [address] [phone]    Admission History and Physical      NAME:  Corrie Gordon   :   1940   MRN:  [id number]     PCP:  Graham Lee MD     Date/Time of service:  2025  1:26 AM        Subjective:     CHIEF COMPLAINT: Chest pressure    HISTORY OF PRESENT ILLNESS:     Ms. Gordon is a 85 y.o.  female with a past medical history of atrial fibrillation, hypertension, peripheral artery disease who is admitted with atrial fibrillation with RVR.  Ms. Gordon states that this evening she developed chest tightness with associated associated heart palpitations and lightheadedness.  She states that she has some slight dyspnea and does notice some lower extremity swelling.  She denies any associated nausea or vomiting or syncope.  Patient states that she was recently diagnosed with A-fib by her cardiologist and placed on Plavix.  However, she states this is the first time these episodes happen to her at rest.  Of note, patient she has scheduled for an outpatient echo and carotid Doppler next week; she is also supposed to get a stress test soon outpatient.    Allergies   Allergen Reactions    Aspirin Hives       Prior to Admission medications    Medication Sig Start Date End Date Taking? Authorizing Provider   clopidogrel (PLAVIX) 75 MG tablet Take 1 tablet by mouth daily 25  Yes ProviderEstephanie MD TRELEGY ELLIPTA 100-62.5-25 MCG/ACT AEPB inhaler TAKE 1 PUFF EVERY DAY 25  Yes Provider, MD Estephanie   losartan (COZAAR) 50 MG tablet Take 1 tablet by mouth daily 3/16/25  Yes Provider, MD Estephanie   omeprazole (PRILOSEC) 20 MG delayed release capsule TAKE ONE CAPSULE BY MOUTH ONE-HALF TO ONE HOUR BEFORE MORNING MEAL ONCE DAILY 25  Yes Provider, MD Estephanie   famotidine (PEPCID) 40 MG tablet Take 1 tablet by mouth nightly 25  Yes ProviderEstephanie MD   amLODIPine (NORVASC) 5 MG tablet Take 1

## 2025-06-05 NOTE — ED TRIAGE NOTES
Pt arrives via EMS.  Pt states that she started feeling like her heart was racing an hour ago.  Pt felt the same way 2 weeks ago and went to her GP and then was referred to cardiology.  Pt saw a cardiologist last thurs and was told she has afib and was placed on plavix.  EMS states in the ambulance ride her HR was in the 130s

## 2025-06-05 NOTE — ED PROVIDER NOTES
[place] EMERGENCY DEPARTMENT  EMERGENCY DEPARTMENT ENCOUNTER      Pt Name: Corrie Gordon  MRN: [id number]  Birthdate 1940  Date of evaluation: 6/4/2025  Provider: JOSELITO LIU MD    CHIEF COMPLAINT       Chief Complaint   Patient presents with    Tachycardia    Atrial Fibrillation         HISTORY OF PRESENT ILLNESS   (Location/Symptom, Timing/Onset, Context/Setting, Quality, Duration, Modifying Factors, Severity)  Note limiting factors.   Patient is a pleasant 85-year-old female who presents emergency department for evaluation of chest pressure and palpitations.  She states she had an episode or 2 of this recently and was evaluated by cardiology Dr. Malcolm in his office.  At that time she was sinus mechanism.  She was placed on Plavix per report.  She states she started feeling bad again and feels somewhat lightheaded.  Denies previous coronary artery disease diagnosis or prior stents.  Family at bedside noted that she is actually scheduled for outpatient echo and carotid Doppler tomorrow and that the cardiologist wanted her to get a stress test sometime soon as well.    The history is provided by the patient and a relative.         Review of External Medical Records:     Nursing Notes were reviewed.    REVIEW OF SYSTEMS    (2-9 systems for level 4, 10 or more for level 5)     Review of Systems   Constitutional:  Negative for diaphoresis and fever.   Respiratory:  Positive for chest tightness.    Cardiovascular:  Positive for chest pain and palpitations.   Neurological:  Positive for light-headedness. Negative for syncope.       Except as noted above the remainder of the review of systems was reviewed and negative.       PAST MEDICAL HISTORY     Past Medical History:   Diagnosis Date    A-fib (HCC)     Hypertension     PUD (peptic ulcer disease)          SURGICAL HISTORY       Past Surgical History:   Procedure Laterality Date    GYN      Hysterectomy    OTHER SURGICAL HISTORY      rectal  care time (minutes):  35    Critical care time was exclusive of:  Separately billable procedures and treating other patients    Critical care was necessary to treat or prevent imminent or life-threatening deterioration of the following conditions:  Cardiac failure    Critical care was time spent personally by me on the following activities:  Blood draw for specimens, development of treatment plan with patient or surrogate, evaluation of patient's response to treatment, examination of patient, ordering and review of laboratory studies, ordering and review of radiographic studies, ordering and performing treatments and interventions, re-evaluation of patient's condition, pulse oximetry, review of old charts and obtaining history from patient or surrogate    I assumed direction of critical care for this patient from another provider in my specialty: no      Care discussed with: admitting provider          FINAL IMPRESSION      1. Atrial fibrillation with rapid ventricular response (HCC)    2. Chest pain, unspecified type          DISPOSITION/PLAN   DISPOSITION Admitted 06/04/2025 11:33:45 PM      (Please note that portions of this note were completed with a voice recognition program.  Efforts were made to edit the dictations but occasionally words are mis-transcribed.)    MARIA INES LIU MD (electronically signed)  Emergency Attending Physician            Maria Ines Liu MD  06/05/25 0050

## 2025-06-05 NOTE — ED NOTES
[facility] at bedside for pt transport to [facility], pt travels on monitor, with 20g in lft ac. Pt pump verified by reza/Krissy prior to transport. Pt report, summary and facesheet given to [facility].

## 2025-07-01 NOTE — DISCHARGE SUMMARY
CONTINUE these medications which have NOT CHANGED     Details   TRELEGY ELLIPTA 100-62.5-25 MCG/ACT AEPB inhaler TAKE 1 PUFF EVERY DAY       losartan (COZAAR) 50 MG tablet Take 1 tablet by mouth daily       omeprazole (PRILOSEC) 20 MG delayed release capsule TAKE ONE CAPSULE BY MOUTH ONE-HALF TO ONE HOUR BEFORE MORNING MEAL ONCE DAILY                 Activity: activity as tolerated  Diet: cardiac diet  Wound Care: none needed     Follow-up with Graham Lee MD in 2 weeks.  Follow-up tests/labs : None     Approximate time spent in patient care on day of discharge: 35 mins     Signed:  Mariya Fiore MD  6/5/2025  4:19 PM